# Patient Record
Sex: MALE | Race: WHITE | NOT HISPANIC OR LATINO | URBAN - NONMETROPOLITAN AREA
[De-identification: names, ages, dates, MRNs, and addresses within clinical notes are randomized per-mention and may not be internally consistent; named-entity substitution may affect disease eponyms.]

---

## 2017-01-20 ENCOUNTER — HISTORY (OUTPATIENT)
Dept: FAMILY MEDICINE | Facility: OTHER | Age: 47
End: 2017-01-20

## 2017-01-20 ENCOUNTER — OFFICE VISIT - GICH (OUTPATIENT)
Dept: FAMILY MEDICINE | Facility: OTHER | Age: 47
End: 2017-01-20

## 2017-01-20 DIAGNOSIS — F43.10 POST-TRAUMATIC STRESS DISORDER: ICD-10-CM

## 2017-01-20 DIAGNOSIS — F10.10 UNCOMPLICATED ALCOHOL ABUSE: ICD-10-CM

## 2017-01-20 DIAGNOSIS — F90.2 ATTENTION-DEFICIT HYPERACTIVITY DISORDER, COMBINED TYPE: ICD-10-CM

## 2017-01-20 DIAGNOSIS — Z00.00 ENCOUNTER FOR GENERAL ADULT MEDICAL EXAMINATION WITHOUT ABNORMAL FINDINGS: ICD-10-CM

## 2017-01-20 DIAGNOSIS — F41.9 ANXIETY DISORDER: ICD-10-CM

## 2017-01-20 ASSESSMENT — PATIENT HEALTH QUESTIONNAIRE - PHQ9: SUM OF ALL RESPONSES TO PHQ QUESTIONS 1-9: 10

## 2018-01-03 NOTE — NURSING NOTE
Patient Information     Patient Name MRN Sex Clay Payne [id number] Male 1970      Nursing Note by Otilia Worthington at 2017  2:00 PM     Author:  Otilia Worthington Service:  (none) Author Type:  (none)     Filed:  2017  2:14 PM Encounter Date:  2017 Status:  Signed     :  Otilia Worthington            Patient presents to the clinic for a [facility] [place] [facility] Physical    Oitlia Worthington LPN....................2017 2:04 PM

## 2018-01-03 NOTE — PROGRESS NOTES
Patient Information     Patient Name MRN Sex Clay Payne [id number] Male 1970      Progress Notes by Clay Parker MD at 2017  2:00 PM     Author:  Clay Parker MD Service:  (none) Author Type:  Physician     Filed:  2017 10:30 AM Encounter Date:  2017 Status:  Signed     :  Clay Parker MD (Physician)            SUBJECTIVE:    Clay Faye is a 46 y.o. male who presents for [facility] physical    HPI Comments: Patient arrives here for a [facility] physical. He is currently court ordered to undergo treatment. He states that this is his 10th treatment. States he had a DUI.. Currently does not offer any complaints or concerns. Patient reports that he was recently pulled over and found to have a DWI. Noted choices alcohol.       No Known Allergies,   Family History      Problem  Relation Age of Onset     Heart Disease Father 68     Cancer-colon No Family History      Cancer-breast No Family History      Cancer-prostate No Family History      Cancer-ovarian No Family History      Blood Disease No Family History      Thyroid Disease No Family History      Hypertension No Family History    ,   Current Outpatient Prescriptions on File Prior to Visit       Medication  Sig Dispense Refill     omeprazole (PRILOSEC) 40 mg capsule Take 40 mg by mouth once daily.       prazosin (MINIPRESS) 2 mg capsule Take  by mouth at bedtime.       No current facility-administered medications on file prior to visit.    ,   Past Surgical History       Procedure   Laterality Date     Hand finger surgery        rt hand tendon repair     ,   Social History      Substance Use Topics        Smoking status:  Never Smoker     Smokeless tobacco:  Current User     Types: Chew     Alcohol use  No    and   Social History      Substance Use Topics        Smoking status:  Never Smoker     Smokeless tobacco:  Current User     Types: Chew     Alcohol use  No       REVIEW OF  "SYSTEMS:  ROS    OBJECTIVE:  /70  Ht 1.778 m (5' 10\")  Wt 98.4 kg (217 lb)  BMI 31.14 kg/m2    EXAM:   Physical Exam   Constitutional: He is well-developed, well-nourished, and in no distress.   HENT:   Head: Normocephalic.   Right Ear: External ear normal.   Left Ear: External ear normal.   Mouth/Throat: No oropharyngeal exudate.   Eyes: Pupils are equal, round, and reactive to light.   Neck: Normal range of motion.   Cardiovascular: Normal rate, regular rhythm and normal heart sounds.    No murmur heard.  Pulmonary/Chest: Effort normal and breath sounds normal.   Abdominal: Soft. Bowel sounds are normal.   Musculoskeletal: Normal range of motion.   Neurological: He is alert. Gait normal.   Psychiatric: Affect normal.       ASSESSMENT/PLAN:    ICD-10-CM    1. Physical exam Z00.00    2. ETOH abuse F10.10    3. PTSD (post-traumatic stress disorder) per patient F43.10    4. Anxiety F41.9    5. Attention deficit hyperactivity disorder (ADHD) per patient, combined type F90.2         Plan:  He seems to be very motivated for for this 10th inpatient treatment. Patient is medically cleared to undergo chemical dependency treatment. Forms are signed.        "

## 2018-01-26 VITALS
DIASTOLIC BLOOD PRESSURE: 70 MMHG | WEIGHT: 217 LBS | HEIGHT: 70 IN | SYSTOLIC BLOOD PRESSURE: 104 MMHG | BODY MASS INDEX: 31.07 KG/M2

## 2018-02-03 ASSESSMENT — PATIENT HEALTH QUESTIONNAIRE - PHQ9: SUM OF ALL RESPONSES TO PHQ QUESTIONS 1-9: 10

## 2018-02-09 ENCOUNTER — DOCUMENTATION ONLY (OUTPATIENT)
Dept: FAMILY MEDICINE | Facility: OTHER | Age: 48
End: 2018-02-09

## 2018-02-09 PROBLEM — F90.2 ATTENTION DEFICIT HYPERACTIVITY DISORDER (ADHD), COMBINED TYPE: Status: ACTIVE | Noted: 2017-01-21

## 2018-02-09 PROBLEM — F10.10 ETOH ABUSE: Status: ACTIVE | Noted: 2017-01-20

## 2018-02-09 PROBLEM — F43.10 PTSD (POST-TRAUMATIC STRESS DISORDER): Status: ACTIVE | Noted: 2017-01-21

## 2018-02-09 PROBLEM — F41.9 ANXIETY: Status: ACTIVE | Noted: 2017-01-21

## 2018-02-09 PROBLEM — Z00.00 PHYSICAL EXAM: Status: ACTIVE | Noted: 2017-01-20

## 2018-02-09 RX ORDER — OMEPRAZOLE 40 MG/1
40 CAPSULE, DELAYED RELEASE ORAL DAILY
COMMUNITY

## 2018-02-09 RX ORDER — PRAZOSIN HYDROCHLORIDE 2 MG/1
CAPSULE ORAL AT BEDTIME
COMMUNITY

## 2018-02-09 RX ORDER — BUPROPION HYDROCHLORIDE 300 MG/1
300 TABLET ORAL DAILY
COMMUNITY
Start: 2017-01-09

## 2018-03-24 ENCOUNTER — HOSPITAL ENCOUNTER (EMERGENCY)
Facility: OTHER | Age: 48
Discharge: HOME | End: 2018-03-24
Attending: FAMILY MEDICINE | Admitting: FAMILY MEDICINE
Payer: COMMERCIAL

## 2018-03-24 ENCOUNTER — APPOINTMENT (OUTPATIENT)
Dept: ULTRASOUND IMAGING | Facility: OTHER | Age: 48
End: 2018-03-24
Attending: FAMILY MEDICINE
Payer: COMMERCIAL

## 2018-03-24 VITALS
RESPIRATION RATE: 24 BRPM | SYSTOLIC BLOOD PRESSURE: 111 MMHG | HEIGHT: 71 IN | BODY MASS INDEX: 24.5 KG/M2 | TEMPERATURE: 97.4 F | HEART RATE: 104 BPM | WEIGHT: 175 LBS | DIASTOLIC BLOOD PRESSURE: 70 MMHG | OXYGEN SATURATION: 94 %

## 2018-03-24 DIAGNOSIS — N45.1 EPIDIDYMITIS WITH NO ABSCESS: Primary | ICD-10-CM

## 2018-03-24 LAB
ALBUMIN UR-MCNC: 30 MG/DL
ANION GAP SERPL CALCULATED.3IONS-SCNC: 7 MMOL/L (ref 3–14)
APPEARANCE UR: ABNORMAL
BACTERIA #/AREA URNS HPF: ABNORMAL /HPF
BASOPHILS # BLD AUTO: 0 10E9/L (ref 0–0.2)
BASOPHILS NFR BLD AUTO: 0.2 %
BILIRUB UR QL STRIP: NEGATIVE
BUN SERPL-MCNC: 12 MG/DL (ref 7–25)
CALCIUM SERPL-MCNC: 9.3 MG/DL (ref 8.6–10.3)
CHLORIDE SERPL-SCNC: 103 MMOL/L (ref 98–107)
CO2 SERPL-SCNC: 28 MMOL/L (ref 21–31)
COLOR UR AUTO: YELLOW
CREAT SERPL-MCNC: 0.91 MG/DL (ref 0.7–1.3)
DIFFERENTIAL METHOD BLD: NORMAL
EOSINOPHIL # BLD AUTO: 0.1 10E9/L (ref 0–0.7)
EOSINOPHIL NFR BLD AUTO: 1.7 %
ERYTHROCYTE [DISTWIDTH] IN BLOOD BY AUTOMATED COUNT: 13.5 % (ref 10–15)
GFR SERPL CREATININE-BSD FRML MDRD: 90 ML/MIN/1.7M2
GLUCOSE SERPL-MCNC: 97 MG/DL (ref 70–105)
GLUCOSE UR STRIP-MCNC: NEGATIVE MG/DL
HCT VFR BLD AUTO: 44.7 % (ref 40–53)
HGB BLD-MCNC: 15.4 G/DL (ref 13.3–17.7)
HGB UR QL STRIP: ABNORMAL
IMM GRANULOCYTES # BLD: 0.1 10E9/L (ref 0–0.4)
IMM GRANULOCYTES NFR BLD: 0.9 %
KETONES UR STRIP-MCNC: NEGATIVE MG/DL
LACTATE SERPL-SCNC: 0.7 MMOL/L (ref 0.5–2.2)
LEUKOCYTE ESTERASE UR QL STRIP: ABNORMAL
LYMPHOCYTES # BLD AUTO: 1.6 10E9/L (ref 0.8–5.3)
LYMPHOCYTES NFR BLD AUTO: 19.3 %
MCH RBC QN AUTO: 31.2 PG (ref 26.5–33)
MCHC RBC AUTO-ENTMCNC: 34.5 G/DL (ref 31.5–36.5)
MCV RBC AUTO: 91 FL (ref 78–100)
MONOCYTES # BLD AUTO: 1 10E9/L (ref 0–1.3)
MONOCYTES NFR BLD AUTO: 11.4 %
NEUTROPHILS # BLD AUTO: 5.6 10E9/L (ref 1.6–8.3)
NEUTROPHILS NFR BLD AUTO: 66.5 %
NITRATE UR QL: NEGATIVE
PH UR STRIP: 6.5 PH (ref 5–7)
PLATELET # BLD AUTO: 208 10E9/L (ref 150–450)
POTASSIUM SERPL-SCNC: 4.1 MMOL/L (ref 3.5–5.1)
RBC # BLD AUTO: 4.93 10E12/L (ref 4.4–5.9)
RBC #/AREA URNS AUTO: ABNORMAL /HPF
SODIUM SERPL-SCNC: 138 MMOL/L (ref 134–144)
SOURCE: ABNORMAL
SP GR UR STRIP: 1.02 (ref 1–1.03)
SPERM #/AREA URNS HPF: PRESENT /HPF
UROBILINOGEN UR STRIP-ACNC: 0.2 EU/DL (ref 0.2–1)
WBC # BLD AUTO: 8.4 10E9/L (ref 4–11)
WBC #/AREA URNS AUTO: ABNORMAL /HPF

## 2018-03-24 PROCEDURE — 25000132 ZZH RX MED GY IP 250 OP 250 PS 637: Performed by: FAMILY MEDICINE

## 2018-03-24 PROCEDURE — 76870 US EXAM SCROTUM: CPT | Mod: TC

## 2018-03-24 PROCEDURE — 81001 URINALYSIS AUTO W/SCOPE: CPT | Performed by: FAMILY MEDICINE

## 2018-03-24 PROCEDURE — 99284 EMERGENCY DEPT VISIT MOD MDM: CPT | Mod: Z6 | Performed by: FAMILY MEDICINE

## 2018-03-24 PROCEDURE — 99284 EMERGENCY DEPT VISIT MOD MDM: CPT | Mod: 25 | Performed by: FAMILY MEDICINE

## 2018-03-24 PROCEDURE — 96372 THER/PROPH/DIAG INJ SC/IM: CPT | Performed by: FAMILY MEDICINE

## 2018-03-24 PROCEDURE — 25000128 H RX IP 250 OP 636: Performed by: FAMILY MEDICINE

## 2018-03-24 PROCEDURE — 80048 BASIC METABOLIC PNL TOTAL CA: CPT | Performed by: FAMILY MEDICINE

## 2018-03-24 PROCEDURE — 36415 COLL VENOUS BLD VENIPUNCTURE: CPT | Performed by: FAMILY MEDICINE

## 2018-03-24 PROCEDURE — 83605 ASSAY OF LACTIC ACID: CPT | Performed by: FAMILY MEDICINE

## 2018-03-24 PROCEDURE — 85025 COMPLETE CBC W/AUTO DIFF WBC: CPT | Performed by: FAMILY MEDICINE

## 2018-03-24 RX ORDER — DOXYCYCLINE 100 MG/1
100 CAPSULE ORAL 2 TIMES DAILY
Qty: 20 CAPSULE | Refills: 0 | Status: SHIPPED | OUTPATIENT
Start: 2018-03-24

## 2018-03-24 RX ORDER — PSEUDOEPHEDRINE HCL 120 MG/1
120 TABLET, FILM COATED, EXTENDED RELEASE ORAL EVERY 12 HOURS
COMMUNITY

## 2018-03-24 RX ORDER — OXYCODONE AND ACETAMINOPHEN 5; 325 MG/1; MG/1
2 TABLET ORAL ONCE
Status: COMPLETED | OUTPATIENT
Start: 2018-03-24 | End: 2018-03-24

## 2018-03-24 RX ORDER — CEFTRIAXONE SODIUM 1 G
500 VIAL (EA) INJECTION ONCE
Status: COMPLETED | OUTPATIENT
Start: 2018-03-24 | End: 2018-03-24

## 2018-03-24 RX ORDER — GABAPENTIN 300 MG/1
TABLET, FILM COATED ORAL
COMMUNITY

## 2018-03-24 RX ADMIN — CEFTRIAXONE SODIUM 500 MG: 500 INJECTION, POWDER, FOR SOLUTION INTRAMUSCULAR; INTRAVENOUS at 20:34

## 2018-03-24 RX ADMIN — OXYCODONE HYDROCHLORIDE AND ACETAMINOPHEN 2 TABLET: 5; 325 TABLET ORAL at 19:45

## 2018-03-24 ASSESSMENT — ENCOUNTER SYMPTOMS
CHILLS: 0
SHORTNESS OF BREATH: 0
DIFFICULTY URINATING: 0
FLANK PAIN: 0
PHOTOPHOBIA: 0
CHOKING: 0
HEADACHES: 0
ADENOPATHY: 0
DYSURIA: 0
POLYPHAGIA: 0
ABDOMINAL PAIN: 1
BACK PAIN: 0
LIGHT-HEADEDNESS: 0
FACIAL SWELLING: 0
FEVER: 0
COUGH: 0

## 2018-03-24 NOTE — ED AVS SNAPSHOT
[facility]    [address]    [place] [address]    Phone:  [phone]    Fax:  [phone]                                       Clay Faye   MRN: [id number]    Department:  [facility]   Date of Visit:  3/24/2018           Patient Information     Date Of Birth          1970        Your diagnoses for this visit were:     Epididymitis with no abscess        You were seen by Anoop Carmen MD.      Follow-up Information     Follow up with No Ref-Primary, Physician.    Why:  As needed        Follow up with [facility].    Specialty:  EMERGENCY MEDICINE    Why:  If symptoms worsen    Contact information:    [address]  [place] [address] [phone]        Follow up with No Ref-Primary, Physician.        Discharge Instructions         Epididymitis  Inflammation of the epididymis can cause pain and swelling in your scrotum. The epididymis is a small tube next to the testicle that stores sperm. Epididymitis is usually caused by an infection. In sexually active men, it is often caused by a sexually transmitted disease (STD) such as chlamydia or gonorrhea. In boys and in men over 40, it can be from bacteria from other parts of the urinary tract (not an STD infection).  Symptoms may begin with pain in the lower belly (abdomen) or low back. The pain then spreads down into the scrotum. Usually only one side is affected. The testicle and scrotum swell and become very painful and red. You may have fever and a burning when passing urine. Sometimes you may have a discharge from the penis.  Treatment is with antibiotics, and anti-inflammatory and pain medicines. The condition should get better over the first few days of treatment. But it will take several weeks for all the swelling and discomfort to go away. If your healthcare provider suspects that an STD is the cause, your sexual partners may need to be  treated.  Home care  The following will help you care for yourself at home:    Support the scrotum. When lying down, place a rolled towel under the scrotum. When walking, use an athletic supporter or 2 pairs of jockey-style underwear.    To relieve pain, put ice packs on the inflamed area. You can make your own ice pack by putting ice cubes in a sealed plastic bag wrapped in a thin towel.    You may use over-the-counter medicines to control pain, unless another medicine was given. If you have chronic liver or kidney disease, talk with your healthcare provider before taking these medicines. Also talk with your provider if you've ever had a stomach ulcer or GI bleeding.    Rest in bed for the first few days until the fever, pain, and swelling get better. It may take several weeks for all of the swelling to go away.    Constipation can make you strain. This makes the pain worse. Avoid constipation by eating natural laxatives such as prunes, fresh fruits, and whole-grain cereals. If necessary, use a mild over-the-counter laxative for constipation. Mineral oil can be used to keep the stools soft.    Do not have sex until you have finished all treatment and all symptoms have cleared.    Take all medicine as directed. Do not miss any doses and do not stop early even if you feel better.  Follow-up care  Follow up with your healthcare provider, or as advised, to be sure you are responding properly to treatment. If a culture was taken, you may call for the result as directed. A culture test can ensure that you are on the correct antibiotic.   When to seek medical advice  Call your healthcare provider right away if any of these occur:    Fever of 100.4 F (38 C), or as directed by your healthcare provider    Increasing pain or swelling of the testicle after starting treatment    Pressure or pain in your bladder that gets worse    Unable to pass urine for 8 hours  Date Last Reviewed: 9/1/2016 2000-2017 [employer]  [employer]. [address] [place] [address]. All rights reserved. This information is not intended as a substitute for professional medical care. Always follow your healthcare professional's instructions.          24 Hour Appointment Hotline       To make an appointment at any [facility], call [phone] ([phone]). If you don't have a family doctor or clinic, we will help you find one. [facility] are conveniently located to serve the needs of you and your family.             Review of your medicines      START taking        Dose / Directions Last dose taken    doxycycline monohydrate 100 MG capsule   Dose:  100 mg   Quantity:  20 capsule        Take 1 capsule (100 mg) by mouth 2 times daily   Refills:  0          Our records show that you are taking the medicines listed below. If these are incorrect, please call your family doctor or clinic.        Dose / Directions Last dose taken    aspirin-acetaminophen-caffeine 250-250-65 MG per tablet   Commonly known as:  EXCEDRIN MIGRAINE   Dose:  1 tablet        Take 1 tablet by mouth every 6 hours as needed for headaches   Refills:  0        buPROPion 300 MG 24 hr tablet   Commonly known as:  WELLBUTRIN XL   Dose:  300 mg        Take 300 mg by mouth daily   Refills:  0        gabapentin 300 MG 24 hr tablet   Commonly known as:  GRALISE        Take by mouth daily (with dinner)   Refills:  0        omeprazole 40 MG capsule   Commonly known as:  priLOSEC   Dose:  40 mg        Take 40 mg by mouth daily   Refills:  0        prazosin 2 MG capsule   Commonly known as:  MINIPRESS        Take by mouth At Bedtime   Refills:  0        pseudoePHEDrine 120 MG 12 hr tablet   Commonly known as:  SUDAFED   Dose:  120 mg        Take 120 mg by mouth every 12 hours   Refills:  0                Prescriptions were sent or printed at these locations (1 Prescription)                   [facility]   [address] [place] [address]  "   Telephone:     Fax:     Hours:                  [facility] (1 of 1)         doxycycline monohydrate 100 MG capsule                Procedures and tests performed during your visit     *UA reflex to Microscopic    Basic metabolic panel    CBC with platelets differential    Lactic acid    US Testicular and Scrotum    Urine Microscopic      Orders Needing Specimen Collection     None      Pending Results     No orders found from 3/22/2018 to 3/25/2018.            Pending Culture Results     No orders found from 3/22/2018 to 3/25/2018.            Thank you for choosing [facility]       Thank you for choosing [facility] for your care. Our goal is always to provide you with excellent care. Hearing back from our patients is one way we can continue to improve our services. Please take a few minutes to complete the written survey that you may receive in the mail after you visit with us. Thank you!        [facility] Information     [employer] lets you send messages to your doctor, view your test results, renew your prescriptions, schedule appointments and more. To sign up, go to [email or web address] . Click on \"Log in\" on the left side of the screen, which will take you to the Welcome page. Then click on \"Sign up Now\" on the right side of the page.     You will be asked to enter the access code listed below, as well as some personal information. Please follow the directions to create your username and password.     Your access code is: [email or web address]  Expires: 2018  9:06 PM     Your access code will  in 90 days. If you need help or a new code, please call your [facility] clinic or [phone].        Care EveryWhere ID     This is your Care EveryWhere ID. This could be used by other organizations to access your [facility] medical records  [id number]        Equal Access to Services     LILI ASCENCIO AH: Bernadette Vicente, atilio richard, thomas atkinson. So [facility] " [phone].    ATENCIÓN: Si habla español, tiene a draper disposición servicios gratuitos de asistencia lingüística. Llame al [phone].    We comply with applicable federal civil rights laws and [place] laws. We do not discriminate on the basis of race, color, national origin, age, disability, sex, sexual orientation, or gender identity.            After Visit Summary       This is your record. Keep this with you and show to your community pharmacist(s) and doctor(s) at your next visit.

## 2018-03-24 NOTE — ED NOTES
Pt has swollen right testicle and nodule for about one month. Pt also has infected area on left lower quadrant in abd. Pt denies injury to stomach, denies cuts. Pt reports severe pain that comes and goes on the whole left side of his groin and abd.    Lilian Judd RN on 3/24/2018 at 5:17 PM

## 2018-03-24 NOTE — ED AVS SNAPSHOT
[facility]    [address]    [place] [address]    Phone:  [phone]    Fax:  [phone]                                       Clay Faye   MRN: [id number]    Department:  [facility]   Date of Visit:  3/24/2018           After Visit Summary Signature Page     I have received my discharge instructions, and my questions have been answered. I have discussed any challenges I see with this plan with the nurse or doctor.    ..........................................................................................................................................  Patient/Patient Representative Signature      ..........................................................................................................................................  Patient Representative Print Name and Relationship to Patient    ..................................................               ................................................  Date                                            Time    ..........................................................................................................................................  Reviewed by Signature/Title    ...................................................              ..............................................  Date                                                            Time

## 2018-03-25 NOTE — DISCHARGE INSTRUCTIONS
Epididymitis  Inflammation of the epididymis can cause pain and swelling in your scrotum. The epididymis is a small tube next to the testicle that stores sperm. Epididymitis is usually caused by an infection. In sexually active men, it is often caused by a sexually transmitted disease (STD) such as chlamydia or gonorrhea. In boys and in men over 40, it can be from bacteria from other parts of the urinary tract (not an STD infection).  Symptoms may begin with pain in the lower belly (abdomen) or low back. The pain then spreads down into the scrotum. Usually only one side is affected. The testicle and scrotum swell and become very painful and red. You may have fever and a burning when passing urine. Sometimes you may have a discharge from the penis.  Treatment is with antibiotics, and anti-inflammatory and pain medicines. The condition should get better over the first few days of treatment. But it will take several weeks for all the swelling and discomfort to go away. If your healthcare provider suspects that an STD is the cause, your sexual partners may need to be treated.  Home care  The following will help you care for yourself at home:    Support the scrotum. When lying down, place a rolled towel under the scrotum. When walking, use an athletic supporter or 2 pairs of jockey-style underwear.    To relieve pain, put ice packs on the inflamed area. You can make your own ice pack by putting ice cubes in a sealed plastic bag wrapped in a thin towel.    You may use over-the-counter medicines to control pain, unless another medicine was given. If you have chronic liver or kidney disease, talk with your healthcare provider before taking these medicines. Also talk with your provider if you've ever had a stomach ulcer or GI bleeding.    Rest in bed for the first few days until the fever, pain, and swelling get better. It may take several weeks for all of the swelling to go away.    Constipation can make you strain. This  makes the pain worse. Avoid constipation by eating natural laxatives such as prunes, fresh fruits, and whole-grain cereals. If necessary, use a mild over-the-counter laxative for constipation. Mineral oil can be used to keep the stools soft.    Do not have sex until you have finished all treatment and all symptoms have cleared.    Take all medicine as directed. Do not miss any doses and do not stop early even if you feel better.  Follow-up care  Follow up with your healthcare provider, or as advised, to be sure you are responding properly to treatment. If a culture was taken, you may call for the result as directed. A culture test can ensure that you are on the correct antibiotic.   When to seek medical advice  Call your healthcare provider right away if any of these occur:    Fever of 100.4 F (38 C), or as directed by your healthcare provider    Increasing pain or swelling of the testicle after starting treatment    Pressure or pain in your bladder that gets worse    Unable to pass urine for 8 hours  Date Last Reviewed: 9/1/2016 2000-2017 [employer]. [address] [place] [address], [place] [address]. All rights reserved. This information is not intended as a substitute for professional medical care. Always follow your healthcare professional's instructions.

## 2018-03-25 NOTE — PROGRESS NOTES
Handoff procedure information verbally given. Patient in bed, side rails up, call light within reach. Ultrasound complete

## 2018-03-25 NOTE — ED PROVIDER NOTES
History   No chief complaint on file.  Pt has swollen right testicle and nodule for about one month. Pt also has infected area on left lower quadrant in abd. Pt denies injury to stomach, denies cuts. Pt reports severe pain that comes and goes on the whole left side of his groin and abd.     HPI  Clay Faye is a 47 year old male who presents the emergency department with left-sided scrotal swelling and a boil on his left lower abdomen.  History of alcohol abuse.  Patient presents with his monogamous partner, nurse's notes reviewed above similar history related to me.  No fevers chills or shakes, no nausea vomiting or diarrhea.    Problem List:    Patient Active Problem List    Diagnosis Date Noted     Anxiety 01/21/2017     Priority: Medium     Attention deficit hyperactivity disorder (ADHD), combined type 01/21/2017     Priority: Medium     PTSD (post-traumatic stress disorder) 01/21/2017     Priority: Medium     ETOH abuse 01/20/2017     Priority: Medium     Physical exam 01/20/2017     Priority: Medium        Past Medical History:    No past medical history on file.    Past Surgical History:    Past Surgical History:   Procedure Laterality Date     FINGER SURGERY      2000,rt hand tendon repair       Family History:    Family History   Problem Relation Age of Onset     HEART DISEASE Father 68     Heart Disease     Colon Cancer No family hx of      Cancer-colon     Breast Cancer No family hx of      Cancer-breast     Prostate Cancer No family hx of      Cancer-prostate     Ovarian Cancer No family hx of      Cancer-ovarian     Blood Disease No family hx of      Blood Disease     Thyroid Disease No family hx of      Thyroid Disease     Hypertension No family hx of      Hypertension       Social History:  Marital Status:   [4]  Social History   Substance Use Topics     Smoking status: Never Smoker     Smokeless tobacco: Current User     Types: Chew     Alcohol use No        Medications:   "    pseudoePHEDrine (SUDAFED) 120 MG 12 hr tablet   aspirin-acetaminophen-caffeine (EXCEDRIN MIGRAINE) 250-250-65 MG per tablet   doxycycline monohydrate 100 MG capsule   omeprazole (PRILOSEC) 40 MG capsule   gabapentin (GRALISE) 300 MG 24 hr tablet   buPROPion (WELLBUTRIN XL) 300 MG 24 hr tablet   prazosin (MINIPRESS) 2 MG capsule         Review of Systems   Constitutional: Negative for chills and fever.   HENT: Negative for facial swelling.    Eyes: Negative for photophobia.   Respiratory: Negative for cough, choking and shortness of breath.    Cardiovascular: Negative for chest pain and leg swelling.   Gastrointestinal: Positive for abdominal pain.   Endocrine: Negative for polyphagia and polyuria.   Genitourinary: Negative for difficulty urinating, dysuria and flank pain.   Musculoskeletal: Negative for back pain.   Neurological: Negative for light-headedness and headaches.   Hematological: Negative for adenopathy.       Physical Exam   BP: 106/69  Pulse: 104  Temp: 99.1  F (37.3  C)  Resp: 24  Height: 180.3 cm (5' 11\")  Weight: 79.4 kg (175 lb)  SpO2: 94 %      Physical Exam   Constitutional: He is oriented to person, place, and time.   Cardiovascular: Normal rate.    No murmur heard.  Pulmonary/Chest: Effort normal and breath sounds normal. No respiratory distress. He has no wheezes. He has no rales.   Abdominal: Soft. Bowel sounds are normal. He exhibits no distension. There is no tenderness.   Genitourinary:         Musculoskeletal: He exhibits no edema.   Neurological: He is alert and oriented to person, place, and time. He has normal reflexes.   Skin: Skin is warm and dry.   Psychiatric: He has a normal mood and affect.   Nursing note and vitals reviewed.      ED Course     ED Course     Procedures             US Testicular and Scrotum (Final result) Result time: 03/24/18 20:47:03     Final result by Deborah Finnegan (03/24/18 20:47:03)     Impression:     IMPRESSION:         1.  Left-sided " epididymitis.  Mild asymmetric increased blood flow within the   left testicle may represent developing left-sided orchitis.      2.  Moderate right and small left hydroceles.      3.  Probable left epididymal spermatocele.         Results for orders placed or performed during the hospital encounter of 03/24/18 (from the past 24 hour(s))   CBC with platelets differential   Result Value Ref Range    WBC 8.4 4.0 - 11.0 10e9/L    RBC Count 4.93 4.4 - 5.9 10e12/L    Hemoglobin 15.4 13.3 - 17.7 g/dL    Hematocrit 44.7 40.0 - 53.0 %    MCV 91 78 - 100 fl    MCH 31.2 26.5 - 33.0 pg    MCHC 34.5 31.5 - 36.5 g/dL    RDW 13.5 10.0 - 15.0 %    Platelet Count 208 150 - 450 10e9/L    Diff Method Automated Method     % Neutrophils 66.5 %    % Lymphocytes 19.3 %    % Monocytes 11.4 %    % Eosinophils 1.7 %    % Basophils 0.2 %    % Immature Granulocytes 0.9 %    Absolute Neutrophil 5.6 1.6 - 8.3 10e9/L    Absolute Lymphocytes 1.6 0.8 - 5.3 10e9/L    Absolute Monocytes 1.0 0.0 - 1.3 10e9/L    Absolute Eosinophils 0.1 0.0 - 0.7 10e9/L    Absolute Basophils 0.0 0.0 - 0.2 10e9/L    Abs Immature Granulocytes 0.1 0 - 0.4 10e9/L   Basic metabolic panel   Result Value Ref Range    Sodium 138 134 - 144 mmol/L    Potassium 4.1 3.5 - 5.1 mmol/L    Chloride 103 98 - 107 mmol/L    Carbon Dioxide 28 21 - 31 mmol/L    Anion Gap 7 3 - 14 mmol/L    Glucose 97 70 - 105 mg/dL    Urea Nitrogen 12 7 - 25 mg/dL    Creatinine 0.91 0.70 - 1.30 mg/dL    GFR Estimate 90 >60 mL/min/1.7m2    GFR Estimate If Black >90 >60 mL/min/1.7m2    Calcium 9.3 8.6 - 10.3 mg/dL   Lactic acid   Result Value Ref Range    Lactic Acid 0.7 0.5 - 2.2 mmol/L   *UA reflex to Microscopic   Result Value Ref Range    Color Urine Yellow     Appearance Urine Slightly Cloudy     Glucose Urine Negative NEG^Negative mg/dL    Bilirubin Urine Negative NEG^Negative    Ketones Urine Negative NEG^Negative mg/dL    Specific Gravity Urine 1.025 1.003 - 1.035    Blood Urine Trace (A)  NEG^Negative    pH Urine 6.5 5.0 - 7.0 pH    Protein Albumin Urine 30 (A) NEG^Negative mg/dL    Urobilinogen Urine 0.2 0.2 - 1.0 EU/dL    Nitrite Urine Negative NEG^Negative    Leukocyte Esterase Urine Small (A) NEG^Negative    Source Unspecified Urine    Urine Microscopic   Result Value Ref Range    WBC Urine  (A) OTO5^0 - 5 /HPF    RBC Urine O - 2 OTO2^O - 2 /HPF    Bacteria Urine Few (A) NEG^Negative /HPF    sperm Present (A) NEG^Negative /HPF   US Testicular and Scrotum    Narrative    EXAM:    US Scrotum    CLINICAL HISTORY:    47 years old, male; Pain and signs and symptoms; Swelling, testicles or   scrotum; Scrotum pain; Additional info: R/O torsion, likely epididymitis    TECHNIQUE:    Real-time ultrasound of the scrotum with color Doppler and image   documentation.    COMPARISON:    No relevant prior studies available.    FINDINGS:    Right testicle:  The right testicle measures 3.6 x 4.0 x 3.1 cm and is normal   in appearance, without evidence of intratesticular lesion.  Normal blood flow   is demonstrated within the right testicle on Doppler imaging.  No torsion.    Left testicle:  Left testicle measures 4.5 x 3.3 x 3.1 cm and demonstrates no   evidence of intratesticular lesion.  Mild asymmetric increased blood flow is   noted within the left testicle in comparison to the right.  No torsion.    Epididymides:  Asymmetric increased blood flow is noted within the left   epididymis in comparison to the right.  A mildly complex cystic structure   within the left epididymis is noted, measuring 0.5 x 0.5 x 0.6 cm, likely   representing a spermatocele.    Scrotum:  Moderate right and small left hydroceles are noted with low level   internal echoes.      Impression    IMPRESSION:         1.  Left-sided epididymitis.  Mild asymmetric increased blood flow within the   left testicle may represent developing left-sided orchitis.      2.  Moderate right and small left hydroceles.      3.  Probable left  epididymal spermatocele.    THIS DOCUMENT HAS BEEN ELECTRONICALLY SIGNED BY HILTON FLOR MD       Medications   oxyCODONE-acetaminophen (PERCOCET) 5-325 MG per tablet 2 tablet (2 tablets Oral Given 3/24/18 1945)   cefTRIAXone (ROCEPHIN) injection 500 mg (500 mg Intramuscular Given 3/24/18 2034)       Assessments & Plan (with Medical Decision Making)       Discharge Medication List as of 3/24/2018  9:06 PM      START taking these medications    Details   doxycycline monohydrate 100 MG capsule Take 1 capsule (100 mg) by mouth 2 times daily, Disp-20 capsule, R-0, [facility]             Final diagnoses:   Epididymitis with no abscess     Rocephin here in the ER, doxycycline for home.  Patient feeling much better after 2 tablets of Percocet in the ER, for home recommend Tylenol and ibuprofen for pain control.  Follow-up with primary care doctor 7-10 days, return to ER sooner with worsening symptoms such as fever.  Scrotal ultrasound shows epididymitis clearly with good Doppler flow.  Patient verbalized understanding plan is in agreement.  3/24/2018   [facility] AND [facility]     Anoop Carmen MD  03/24/18 9220

## 2022-11-15 ENCOUNTER — LAB REQUISITION (OUTPATIENT)
Dept: LAB | Facility: CLINIC | Age: 52
End: 2022-11-15

## 2022-11-15 LAB — INR PPP: 1.09 (ref 0.85–1.15)

## 2022-11-15 PROCEDURE — 85610 PROTHROMBIN TIME: CPT | Performed by: FAMILY MEDICINE

## 2023-05-09 ENCOUNTER — LAB REQUISITION (OUTPATIENT)
Dept: LAB | Facility: CLINIC | Age: 53
End: 2023-05-09

## 2023-05-09 LAB — INR PPP: 1.08 (ref 0.85–1.15)

## 2023-05-09 PROCEDURE — 85610 PROTHROMBIN TIME: CPT | Performed by: NURSE PRACTITIONER

## (undated) RX ORDER — OXYCODONE AND ACETAMINOPHEN 5; 325 MG/1; MG/1
TABLET ORAL
Status: DISPENSED
Start: 2018-03-24

## (undated) RX ORDER — LIDOCAINE HYDROCHLORIDE 10 MG/ML
INJECTION, SOLUTION INFILTRATION; PERINEURAL
Status: DISPENSED
Start: 2018-03-24

## (undated) RX ORDER — CEFTRIAXONE 500 MG/1
INJECTION, POWDER, FOR SOLUTION INTRAMUSCULAR; INTRAVENOUS
Status: DISPENSED
Start: 2018-03-24